# Patient Record
Sex: MALE | Race: OTHER | Employment: UNEMPLOYED | URBAN - METROPOLITAN AREA
[De-identification: names, ages, dates, MRNs, and addresses within clinical notes are randomized per-mention and may not be internally consistent; named-entity substitution may affect disease eponyms.]

---

## 2024-10-16 ENCOUNTER — HOSPITAL ENCOUNTER (EMERGENCY)
Facility: HOSPITAL | Age: 24
Discharge: HOME | End: 2024-10-17
Attending: EMERGENCY MEDICINE

## 2024-10-16 ENCOUNTER — APPOINTMENT (EMERGENCY)
Dept: CT IMAGING | Facility: HOSPITAL | Age: 24
End: 2024-10-16

## 2024-10-16 DIAGNOSIS — R10.9 ACUTE LEFT FLANK PAIN: ICD-10-CM

## 2024-10-16 DIAGNOSIS — N39.0 UTI (URINARY TRACT INFECTION): Primary | ICD-10-CM

## 2024-10-16 DIAGNOSIS — R11.2 NAUSEA & VOMITING: ICD-10-CM

## 2024-10-16 LAB
ALBUMIN SERPL BCG-MCNC: 4.8 G/DL (ref 3.5–5)
ALP SERPL-CCNC: 104 U/L (ref 34–104)
ALT SERPL W P-5'-P-CCNC: 10 U/L (ref 7–52)
ANION GAP SERPL CALCULATED.3IONS-SCNC: 10 MMOL/L (ref 4–13)
AST SERPL W P-5'-P-CCNC: 22 U/L (ref 13–39)
BACTERIA UR QL AUTO: ABNORMAL /HPF
BASOPHILS # BLD AUTO: 0.03 THOUSANDS/ΜL (ref 0–0.1)
BASOPHILS NFR BLD AUTO: 0 % (ref 0–1)
BILIRUB SERPL-MCNC: 0.36 MG/DL (ref 0.2–1)
BILIRUB UR QL STRIP: NEGATIVE
BUN SERPL-MCNC: 12 MG/DL (ref 5–25)
CALCIUM SERPL-MCNC: 9.4 MG/DL (ref 8.4–10.2)
CHLORIDE SERPL-SCNC: 108 MMOL/L (ref 96–108)
CLARITY UR: CLEAR
CO2 SERPL-SCNC: 24 MMOL/L (ref 21–32)
COLOR UR: ABNORMAL
CREAT SERPL-MCNC: 1.01 MG/DL (ref 0.6–1.3)
EOSINOPHIL # BLD AUTO: 0.24 THOUSAND/ΜL (ref 0–0.61)
EOSINOPHIL NFR BLD AUTO: 3 % (ref 0–6)
ERYTHROCYTE [DISTWIDTH] IN BLOOD BY AUTOMATED COUNT: 11.8 % (ref 11.6–15.1)
GFR SERPL CREATININE-BSD FRML MDRD: 105 ML/MIN/1.73SQ M
GLUCOSE SERPL-MCNC: 93 MG/DL (ref 65–140)
GLUCOSE UR STRIP-MCNC: NEGATIVE MG/DL
HCT VFR BLD AUTO: 41.7 % (ref 36.5–49.3)
HGB BLD-MCNC: 14.6 G/DL (ref 12–17)
HGB UR QL STRIP.AUTO: NEGATIVE
IMM GRANULOCYTES # BLD AUTO: 0.01 THOUSAND/UL (ref 0–0.2)
IMM GRANULOCYTES NFR BLD AUTO: 0 % (ref 0–2)
KETONES UR STRIP-MCNC: NEGATIVE MG/DL
LEUKOCYTE ESTERASE UR QL STRIP: 100
LIPASE SERPL-CCNC: 35 U/L (ref 11–82)
LYMPHOCYTES # BLD AUTO: 2.57 THOUSANDS/ΜL (ref 0.6–4.47)
LYMPHOCYTES NFR BLD AUTO: 29 % (ref 14–44)
MCH RBC QN AUTO: 31.1 PG (ref 26.8–34.3)
MCHC RBC AUTO-ENTMCNC: 35 G/DL (ref 31.4–37.4)
MCV RBC AUTO: 89 FL (ref 82–98)
MONOCYTES # BLD AUTO: 0.65 THOUSAND/ΜL (ref 0.17–1.22)
MONOCYTES NFR BLD AUTO: 7 % (ref 4–12)
MUCOUS THREADS UR QL AUTO: ABNORMAL
NEUTROPHILS # BLD AUTO: 5.4 THOUSANDS/ΜL (ref 1.85–7.62)
NEUTS SEG NFR BLD AUTO: 61 % (ref 43–75)
NITRITE UR QL STRIP: NEGATIVE
NON-SQ EPI CELLS URNS QL MICRO: ABNORMAL /HPF
NRBC BLD AUTO-RTO: 0 /100 WBCS
PH UR STRIP.AUTO: 6.5 [PH]
PLATELET # BLD AUTO: 302 THOUSANDS/UL (ref 149–390)
PMV BLD AUTO: 9.5 FL (ref 8.9–12.7)
POTASSIUM SERPL-SCNC: 4.2 MMOL/L (ref 3.5–5.3)
PROT SERPL-MCNC: 7.2 G/DL (ref 6.4–8.4)
PROT UR STRIP-MCNC: ABNORMAL MG/DL
RBC # BLD AUTO: 4.69 MILLION/UL (ref 3.88–5.62)
RBC #/AREA URNS AUTO: ABNORMAL /HPF
SODIUM SERPL-SCNC: 142 MMOL/L (ref 135–147)
SP GR UR STRIP.AUTO: 1.02 (ref 1–1.04)
UROBILINOGEN UA: NEGATIVE MG/DL
WBC # BLD AUTO: 8.9 THOUSAND/UL (ref 4.31–10.16)
WBC #/AREA URNS AUTO: ABNORMAL /HPF
WBC CASTS URNS QL MICRO: ABNORMAL /LPF

## 2024-10-16 PROCEDURE — 96361 HYDRATE IV INFUSION ADD-ON: CPT

## 2024-10-16 PROCEDURE — 81003 URINALYSIS AUTO W/O SCOPE: CPT

## 2024-10-16 PROCEDURE — 87086 URINE CULTURE/COLONY COUNT: CPT

## 2024-10-16 PROCEDURE — 99284 EMERGENCY DEPT VISIT MOD MDM: CPT

## 2024-10-16 PROCEDURE — 74177 CT ABD & PELVIS W/CONTRAST: CPT

## 2024-10-16 PROCEDURE — 80053 COMPREHEN METABOLIC PANEL: CPT

## 2024-10-16 PROCEDURE — 85025 COMPLETE CBC W/AUTO DIFF WBC: CPT

## 2024-10-16 PROCEDURE — 83690 ASSAY OF LIPASE: CPT

## 2024-10-16 PROCEDURE — 81001 URINALYSIS AUTO W/SCOPE: CPT

## 2024-10-16 PROCEDURE — 36415 COLL VENOUS BLD VENIPUNCTURE: CPT

## 2024-10-16 RX ADMIN — SODIUM CHLORIDE 1000 ML: 0.9 INJECTION, SOLUTION INTRAVENOUS at 21:48

## 2024-10-16 RX ADMIN — IOHEXOL 85 ML: 350 INJECTION, SOLUTION INTRAVENOUS at 23:27

## 2024-10-17 VITALS
HEART RATE: 50 BPM | SYSTOLIC BLOOD PRESSURE: 135 MMHG | OXYGEN SATURATION: 99 % | WEIGHT: 154.1 LBS | DIASTOLIC BLOOD PRESSURE: 80 MMHG | TEMPERATURE: 98.2 F | RESPIRATION RATE: 18 BRPM

## 2024-10-17 PROCEDURE — 96374 THER/PROPH/DIAG INJ IV PUSH: CPT

## 2024-10-17 RX ORDER — PHENAZOPYRIDINE HYDROCHLORIDE 200 MG/1
200 TABLET, FILM COATED ORAL 3 TIMES DAILY
Qty: 6 TABLET | Refills: 0 | Status: SHIPPED | OUTPATIENT
Start: 2024-10-17

## 2024-10-17 RX ORDER — CEPHALEXIN 500 MG/1
500 CAPSULE ORAL EVERY 6 HOURS SCHEDULED
Qty: 28 CAPSULE | Refills: 0 | Status: SHIPPED | OUTPATIENT
Start: 2024-10-17 | End: 2024-10-24

## 2024-10-17 RX ORDER — ONDANSETRON 4 MG/1
4 TABLET, ORALLY DISINTEGRATING ORAL EVERY 6 HOURS PRN
Qty: 20 TABLET | Refills: 0 | Status: SHIPPED | OUTPATIENT
Start: 2024-10-17

## 2024-10-17 RX ORDER — KETOROLAC TROMETHAMINE 30 MG/ML
15 INJECTION, SOLUTION INTRAMUSCULAR; INTRAVENOUS ONCE
Status: COMPLETED | OUTPATIENT
Start: 2024-10-17 | End: 2024-10-17

## 2024-10-17 RX ORDER — CEPHALEXIN 500 MG/1
500 CAPSULE ORAL ONCE
Status: COMPLETED | OUTPATIENT
Start: 2024-10-17 | End: 2024-10-17

## 2024-10-17 RX ADMIN — KETOROLAC TROMETHAMINE 15 MG: 30 INJECTION, SOLUTION INTRAMUSCULAR; INTRAVENOUS at 01:01

## 2024-10-17 RX ADMIN — CEPHALEXIN 500 MG: 500 CAPSULE ORAL at 00:57

## 2024-10-17 NOTE — ED NOTES
Patient was seen by the provider with results of testing and discharge and follow up instructions reviewed with patient. Patient was medicated with toradol prior to discharge.     Cathleen Espana RN  10/17/24 0112

## 2024-10-17 NOTE — ED PROVIDER NOTES
Time reflects when diagnosis was documented in both MDM as applicable and the Disposition within this note       Time User Action Codes Description Comment    10/17/2024 12:52 AM Mary Sims [N39.0] UTI (urinary tract infection)     10/17/2024 12:52 AM Mary Sims [R10.9] Acute left flank pain     10/17/2024 12:52 AM Mary Sims [R11.2] Nausea & vomiting           ED Disposition       ED Disposition   Discharge    Condition   Stable    Date/Time   Thu Oct 17, 2024 12:52 AM    Comment   Kenji Hernandes Joy discharge to home/self care.                   Assessment & Plan         Medical Decision Making  Patient presents for flank pain, left inguinal pain, and dysuria.  He is afebrile and nontoxic-appearing.  Differential diagnosis includes but is not limited to renal colic, UTI, pyelonephritis, inguinal hernia, colitis, diverticulitis, mesenteric adenitis, or musculoskeletal pain.  Urine microscopic showed 30-50 WBCs, but no epithelial cells or bacteria.  Given the amount of WBCs and dysuria will prescribe a course of Keflex while the urine culture is pending.  Labs and CT unremarkable.  Reviewed all results with the patient and his questions were answered.  Return precautions discussed and he verbalized understanding.  Establish care with a PCP, and return to the ED in the interim with new or worsening symptoms.    Problems Addressed:  Acute left flank pain: acute illness or injury  Nausea & vomiting: acute illness or injury  UTI (urinary tract infection): acute illness or injury    Amount and/or Complexity of Data Reviewed  Labs: ordered.  Radiology: ordered. Decision-making details documented in ED Course.    Risk  Prescription drug management.        ED Course as of 10/17/24 0533   u Oct 17, 2024   0033 CT abdomen pelvis with contrast  IMPRESSION:  No acute findings in the abdomen or pelvis.         Medications   sodium chloride 0.9 % bolus 1,000 mL (0 mL Intravenous Stopped 10/17/24  0031)   iohexol (OMNIPAQUE) 350 MG/ML injection (SINGLE-DOSE) 85 mL (85 mL Intravenous Given 10/16/24 2327)   cephalexin (KEFLEX) capsule 500 mg (500 mg Oral Given 10/17/24 0057)   ketorolac (TORADOL) injection 15 mg (15 mg Intravenous Given 10/17/24 0101)       ED Risk Strat Scores           SBIRT 20yo+      Flowsheet Row Most Recent Value   Initial Alcohol Screen: US AUDIT-C     1. How often do you have a drink containing alcohol? 0 Filed at: 10/16/2024 2057   2. How many drinks containing alcohol do you have on a typical day you are drinking?  0 Filed at: 10/16/2024 2057   3a. Male UNDER 65: How often do you have five or more drinks on one occasion? 0 Filed at: 10/16/2024 2057   3b. FEMALE Any Age, or MALE 65+: How often do you have 4 or more drinks on one occassion? 0 Filed at: 10/16/2024 2057   Audit-C Score 0 Filed at: 10/16/2024 2057   EDMUND: How many times in the past year have you...    Used an illegal drug or used a prescription medication for non-medical reasons? Never Filed at: 10/16/2024 2057              History of Present Illness       Chief Complaint   Patient presents with    Difficulty Urinating    Abdominal Pain     Pt reports via  that he has been having abdominal pain, and minimal amounts of orange urine with pain while he urinates. Reports the abdominal pain started several weeks ago, and the urine problem has been going on since he was a child, but the pain with urination started several days ago.     Medical Problem     Pt also reports lump in the right side of his groin, and numbness in his left foot that started today at work.        History reviewed. No pertinent past medical history.   History reviewed. No pertinent surgical history.   History reviewed. No pertinent family history.   Social History     Tobacco Use    Smoking status: Some Days     Types: Cigarettes    Smokeless tobacco: Never   Vaping Use    Vaping status: Never Used   Substance Use Topics    Alcohol use: Never  "   Drug use: Never      E-Cigarette/Vaping    E-Cigarette Use Never User       E-Cigarette/Vaping Substances      I have reviewed and agree with the history as documented.     The patient is a 24 y.o. male with no pertinent past medical history, who presents for evaluation of flank pain.  He reports several days of bilateral flank pain, left > right with associated dysuria, decreased urination, orange-colored urine, nausea, vomiting, and fevers.  Patient explains that he only urinates 1-2 drops at a time.  He also reports developing a \"ball\" in his groin approximately one month ago, but three days ago he noticed three \"balls\" that were painful.  None of the \"balls\" encompass the penis or testicles.  Patient denies URI symptoms, hematuria, or diarrhea.  He took 1 g of Ampicillin and ibuprofen without relief.      History provided by:  Patient and parent   used: Yes (Miradia Jalyn)        Review of Systems   Constitutional:  Positive for fever. Negative for chills.   HENT:  Negative for congestion, ear pain, rhinorrhea and sore throat.    Eyes:  Negative for pain and visual disturbance.   Respiratory:  Negative for cough and shortness of breath.    Cardiovascular:  Negative for chest pain and palpitations.   Gastrointestinal:  Positive for nausea and vomiting. Negative for abdominal pain and diarrhea.   Genitourinary:  Positive for decreased urine volume, difficulty urinating, dysuria and flank pain. Negative for frequency, hematuria, penile discharge, penile pain, penile swelling and testicular pain.   Musculoskeletal:  Negative for arthralgias, back pain and myalgias.   Skin:  Negative for color change and rash.   Neurological:  Negative for dizziness, seizures, syncope, light-headedness and headaches.   All other systems reviewed and are negative.      Objective       ED Triage Vitals   Temperature Pulse Blood Pressure Respirations SpO2 Patient Position - Orthostatic VS "   10/16/24 2054 10/16/24 2054 10/16/24 2054 10/16/24 2054 10/16/24 2054 10/16/24 2054   98.2 °F (36.8 °C) 70 167/93 12 98 % Sitting      Temp Source Heart Rate Source BP Location FiO2 (%) Pain Score    10/16/24 2054 10/16/24 2054 10/16/24 2054 -- 10/17/24 0101    Oral Monitor Left arm  6      Vitals      Date and Time Temp Pulse SpO2 Resp BP Pain Score FACES Pain Rating User   10/17/24 0113 -- 50 99 % 18 135/80 -- -- EY   10/17/24 0101 -- -- -- -- -- 6 -- EY   10/16/24 2337 -- 50 100 % 16 146/92 -- -- EY   10/16/24 2054 98.2 °F (36.8 °C) 70 98 % 12 167/93 -- -- RS            Physical Exam  Vitals and nursing note reviewed. Exam conducted with a chaperone present (RAMBO Diaz).   Constitutional:       General: He is awake.      Appearance: He is well-developed, well-groomed and normal weight. He is not toxic-appearing or diaphoretic.   HENT:      Head: Normocephalic and atraumatic.      Right Ear: External ear normal.      Left Ear: External ear normal.      Nose: Nose normal.      Mouth/Throat:      Lips: Pink.      Mouth: Mucous membranes are moist.      Pharynx: Oropharynx is clear. Uvula midline.   Eyes:      General: Lids are normal. Vision grossly intact. Gaze aligned appropriately. No scleral icterus.     Conjunctiva/sclera: Conjunctivae normal.      Pupils: Pupils are equal, round, and reactive to light.   Cardiovascular:      Rate and Rhythm: Normal rate and regular rhythm.      Heart sounds: S1 normal and S2 normal. No murmur heard.     No friction rub. No gallop.   Pulmonary:      Effort: Pulmonary effort is normal. No respiratory distress.      Breath sounds: Normal breath sounds and air entry. No wheezing, rhonchi or rales.   Abdominal:      General: Abdomen is flat. There is no distension.      Palpations: Abdomen is soft. There is no mass.      Tenderness: There is abdominal tenderness in the left lower quadrant. There is no right CVA tenderness, left CVA tenderness, guarding or rebound.      Hernia:  There is no hernia in the left inguinal area or right inguinal area.      Comments: Mild tenderness in the LLQ without peritoneal signs.   Genitourinary:     Penis: Normal. No erythema, tenderness, discharge, swelling or lesions.       Testes: Normal.         Right: Mass, tenderness or swelling not present.         Left: Mass, tenderness or swelling not present.      Comments: Exquisite tenderness of the left inguinal region.  No palpable lymphadenopathy, hernias, erythema, or warm of the overlying skin.  Musculoskeletal:      Cervical back: Normal, full passive range of motion without pain and neck supple. No rigidity or crepitus. No spinous process tenderness or muscular tenderness.      Thoracic back: Tenderness present. No spasms or bony tenderness.      Lumbar back: Tenderness present. No spasms or bony tenderness.      Comments: Tenderness to palpation of bilateral flanks, left > right.  No point tenderness of the T or L-spine, step-offs, or crepitus.  No CVA tenderness.   Lymphadenopathy:      Lower Body: No right inguinal adenopathy. No left inguinal adenopathy.   Skin:     General: Skin is warm.      Capillary Refill: Capillary refill takes less than 2 seconds.      Coloration: Skin is not pale.      Findings: No rash.   Neurological:      Mental Status: He is alert and oriented to person, place, and time.   Psychiatric:         Attention and Perception: Attention normal.         Mood and Affect: Mood normal.         Speech: Speech normal.         Behavior: Behavior normal. Behavior is cooperative.         Results Reviewed       Procedure Component Value Units Date/Time    Urine Microscopic [[id number]]  (Abnormal) Collected: 10/16/24 2148    Lab Status: Final result Specimen: Urine, Other Updated: 10/16/24 2214     RBC, UA 1-2 /hpf      WBC, UA 30-50 /hpf      Epithelial Cells None Seen /hpf      Bacteria, UA None Seen /hpf      MUCUS THREADS Occasional     WBC Casts, UA 3-5 /lpf     Urine culture  [[id number]] Collected: 10/16/24 2148    Lab Status: In process Specimen: Urine, Other Updated: 10/16/24 2214    Lipase [[id number]]  (Normal) Collected: 10/16/24 2148    Lab Status: Final result Specimen: Blood from Arm, Left Updated: 10/16/24 2211     Lipase 35 u/L     Comprehensive metabolic panel [[id number]] Collected: 10/16/24 2148    Lab Status: Final result Specimen: Blood from Arm, Left Updated: 10/16/24 2211     Sodium 142 mmol/L      Potassium 4.2 mmol/L      Chloride 108 mmol/L      CO2 24 mmol/L      ANION GAP 10 mmol/L      BUN 12 mg/dL      Creatinine 1.01 mg/dL      Glucose 93 mg/dL      Calcium 9.4 mg/dL      AST 22 U/L      ALT 10 U/L      Alkaline Phosphatase 104 U/L      Total Protein 7.2 g/dL      Albumin 4.8 g/dL      Total Bilirubin 0.36 mg/dL      eGFR 105 ml/min/1.73sq m     Narrative:      [employer] guidelines for Chronic Kidney Disease (CKD):     Stage 1 with normal or high GFR (GFR > 90 mL/min/1.73 square meters)    Stage 2 Mild CKD (GFR = 60-89 mL/min/1.73 square meters)    Stage 3A Moderate CKD (GFR = 45-59 mL/min/1.73 square meters)    Stage 3B Moderate CKD (GFR = 30-44 mL/min/1.73 square meters)    Stage 4 Severe CKD (GFR = 15-29 mL/min/1.73 square meters)    Stage 5 End Stage CKD (GFR <15 mL/min/1.73 square meters)  Note: GFR calculation is accurate only with a steady state creatinine    UA w Reflex to Microscopic w Reflex to Culture [[id number]]  (Abnormal) Collected: 10/16/24 2148    Lab Status: Final result Specimen: Urine, Other Updated: 10/16/24 2207     Color, UA Straw     Clarity, UA Clear     Specific Gravity, UA 1.020     pH, UA 6.5     Leukocytes, .0     Nitrite, UA Negative     Protein, UA 15 (Trace) mg/dl      Glucose, UA Negative mg/dl      Ketones, UA Negative mg/dl      Bilirubin, UA Negative     Occult Blood, UA Negative     UROBILINOGEN UA Negative mg/dL     CBC and differential [[id number]] Collected: 10/16/24 2148    Lab Status: Final  result Specimen: Blood from Arm, Left Updated: 10/16/24 2156     WBC 8.90 Thousand/uL      RBC 4.69 Million/uL      Hemoglobin 14.6 g/dL      Hematocrit 41.7 %      MCV 89 fL      MCH 31.1 pg      MCHC 35.0 g/dL      RDW 11.8 %      MPV 9.5 fL      Platelets 302 Thousands/uL      nRBC 0 /100 WBCs      Segmented % 61 %      Immature Grans % 0 %      Lymphocytes % 29 %      Monocytes % 7 %      Eosinophils Relative 3 %      Basophils Relative 0 %      Absolute Neutrophils 5.40 Thousands/µL      Absolute Immature Grans 0.01 Thousand/uL      Absolute Lymphocytes 2.57 Thousands/µL      Absolute Monocytes 0.65 Thousand/µL      Eosinophils Absolute 0.24 Thousand/µL      Basophils Absolute 0.03 Thousands/µL             CT abdomen pelvis with contrast   Final Interpretation by Jocelyn Michael MD (10/16 2356)      No acute findings in the abdomen or pelvis.         Workstation performed: [id number]             Procedures      ED Medication and Procedure Management   None     Discharge Medication List as of 10/17/2024  1:05 AM        START taking these medications    Details   cephalexin (KEFLEX) 500 mg capsule Take 1 capsule (500 mg total) by mouth every 6 (six) hours for 7 days, Starting Thu 10/17/2024, Until Thu 10/24/2024, Normal      ondansetron (ZOFRAN-ODT) 4 mg disintegrating tablet Take 1 tablet (4 mg total) by mouth every 6 (six) hours as needed for nausea or vomiting, Starting Thu 10/17/2024, Normal      phenazopyridine (PYRIDIUM) 200 mg tablet Take 1 tablet (200 mg total) by mouth 3 (three) times a day, Starting Thu 10/17/2024, Normal           No discharge procedures on file.  ED SEPSIS DOCUMENTATION   Time reflects when diagnosis was documented in both MDM as applicable and the Disposition within this note       Time User Action Codes Description Comment    10/17/2024 12:52 AM Mary Sims [N39.0] UTI (urinary tract infection)     10/17/2024 12:52 AM Mary Sims Add [R10.9] Acute left flank pain      10/17/2024 12:52 AM Mary Sims Add [R11.2] Nausea & vomiting              Mary Sims PA-C  10/17/24 0551

## 2024-10-18 LAB — BACTERIA UR CULT: NORMAL

## 2025-04-26 ENCOUNTER — HOSPITAL ENCOUNTER (EMERGENCY)
Facility: HOSPITAL | Age: 25
Discharge: HOME | End: 2025-04-26
Attending: EMERGENCY MEDICINE

## 2025-04-26 ENCOUNTER — HOSPITAL ENCOUNTER (EMERGENCY)
Facility: HOSPITAL | Age: 25
Discharge: HOME | End: 2025-04-26
Attending: EMERGENCY MEDICINE | Admitting: EMERGENCY MEDICINE

## 2025-04-26 VITALS
RESPIRATION RATE: 16 BRPM | WEIGHT: 154.98 LBS | OXYGEN SATURATION: 99 % | HEART RATE: 56 BPM | DIASTOLIC BLOOD PRESSURE: 78 MMHG | SYSTOLIC BLOOD PRESSURE: 140 MMHG | TEMPERATURE: 97.8 F

## 2025-04-26 VITALS
TEMPERATURE: 98.2 F | OXYGEN SATURATION: 98 % | WEIGHT: 154.32 LBS | DIASTOLIC BLOOD PRESSURE: 80 MMHG | SYSTOLIC BLOOD PRESSURE: 142 MMHG | HEART RATE: 69 BPM | RESPIRATION RATE: 18 BRPM

## 2025-04-26 DIAGNOSIS — R19.7 NAUSEA, VOMITING, AND DIARRHEA: Primary | ICD-10-CM

## 2025-04-26 DIAGNOSIS — R11.2 NAUSEA, VOMITING, AND DIARRHEA: Primary | ICD-10-CM

## 2025-04-26 DIAGNOSIS — R19.7 DIARRHEA: ICD-10-CM

## 2025-04-26 DIAGNOSIS — R11.2 NAUSEA AND VOMITING: Primary | ICD-10-CM

## 2025-04-26 LAB
ALBUMIN SERPL BCG-MCNC: 4.7 G/DL (ref 3.5–5)
ALP SERPL-CCNC: 91 U/L (ref 34–104)
ALT SERPL W P-5'-P-CCNC: 21 U/L (ref 7–52)
ANION GAP SERPL CALCULATED.3IONS-SCNC: 11 MMOL/L (ref 4–13)
AST SERPL W P-5'-P-CCNC: 24 U/L (ref 13–39)
BASOPHILS # BLD AUTO: 0.04 THOUSANDS/ÂΜL (ref 0–0.1)
BASOPHILS NFR BLD AUTO: 1 % (ref 0–1)
BILIRUB SERPL-MCNC: 0.46 MG/DL (ref 0.2–1)
BILIRUB UR QL STRIP: NEGATIVE
BUN SERPL-MCNC: 13 MG/DL (ref 5–25)
CALCIUM SERPL-MCNC: 9.5 MG/DL (ref 8.4–10.2)
CHLORIDE SERPL-SCNC: 105 MMOL/L (ref 96–108)
CLARITY UR: CLEAR
CO2 SERPL-SCNC: 23 MMOL/L (ref 21–32)
COLOR UR: ABNORMAL
CREAT SERPL-MCNC: 0.79 MG/DL (ref 0.6–1.3)
EOSINOPHIL # BLD AUTO: 0.33 THOUSAND/ÂΜL (ref 0–0.61)
EOSINOPHIL NFR BLD AUTO: 4 % (ref 0–6)
ERYTHROCYTE [DISTWIDTH] IN BLOOD BY AUTOMATED COUNT: 13.5 % (ref 11.6–15.1)
GFR SERPL CREATININE-BSD FRML MDRD: 127 ML/MIN/1.73SQ M
GLUCOSE SERPL-MCNC: 78 MG/DL (ref 65–140)
GLUCOSE UR STRIP-MCNC: NEGATIVE MG/DL
HCT VFR BLD AUTO: 39 % (ref 36.5–49.3)
HGB BLD-MCNC: 13.6 G/DL (ref 12–17)
HGB UR QL STRIP.AUTO: NEGATIVE
IMM GRANULOCYTES # BLD AUTO: 0.03 THOUSAND/UL (ref 0–0.2)
IMM GRANULOCYTES NFR BLD AUTO: 0 % (ref 0–2)
KETONES UR STRIP-MCNC: NEGATIVE MG/DL
LEUKOCYTE ESTERASE UR QL STRIP: NEGATIVE
LYMPHOCYTES # BLD AUTO: 2.44 THOUSANDS/ÂΜL (ref 0.6–4.47)
LYMPHOCYTES NFR BLD AUTO: 29 % (ref 14–44)
MCH RBC QN AUTO: 30.5 PG (ref 26.8–34.3)
MCHC RBC AUTO-ENTMCNC: 34.9 G/DL (ref 31.4–37.4)
MCV RBC AUTO: 87 FL (ref 82–98)
MONOCYTES # BLD AUTO: 0.72 THOUSAND/ÂΜL (ref 0.17–1.22)
MONOCYTES NFR BLD AUTO: 9 % (ref 4–12)
NEUTROPHILS # BLD AUTO: 4.8 THOUSANDS/ÂΜL (ref 1.85–7.62)
NEUTS SEG NFR BLD AUTO: 57 % (ref 43–75)
NITRITE UR QL STRIP: NEGATIVE
NRBC BLD AUTO-RTO: 0 /100 WBCS
PH UR STRIP.AUTO: 6 [PH]
PLATELET # BLD AUTO: 281 THOUSANDS/UL (ref 149–390)
PMV BLD AUTO: 10.1 FL (ref 8.9–12.7)
POTASSIUM SERPL-SCNC: 3.6 MMOL/L (ref 3.5–5.3)
PROT SERPL-MCNC: 7.4 G/DL (ref 6.4–8.4)
PROT UR STRIP-MCNC: NEGATIVE MG/DL
RBC # BLD AUTO: 4.46 MILLION/UL (ref 3.88–5.62)
SODIUM SERPL-SCNC: 139 MMOL/L (ref 135–147)
SP GR UR STRIP.AUTO: 1.03 (ref 1–1.04)
UROBILINOGEN UA: NEGATIVE MG/DL
WBC # BLD AUTO: 8.36 THOUSAND/UL (ref 4.31–10.16)

## 2025-04-26 PROCEDURE — 85025 COMPLETE CBC W/AUTO DIFF WBC: CPT | Performed by: EMERGENCY MEDICINE

## 2025-04-26 PROCEDURE — 96374 THER/PROPH/DIAG INJ IV PUSH: CPT

## 2025-04-26 PROCEDURE — 99284 EMERGENCY DEPT VISIT MOD MDM: CPT

## 2025-04-26 PROCEDURE — 36415 COLL VENOUS BLD VENIPUNCTURE: CPT | Performed by: EMERGENCY MEDICINE

## 2025-04-26 PROCEDURE — 99283 EMERGENCY DEPT VISIT LOW MDM: CPT

## 2025-04-26 PROCEDURE — 99284 EMERGENCY DEPT VISIT MOD MDM: CPT | Performed by: EMERGENCY MEDICINE

## 2025-04-26 PROCEDURE — 80053 COMPREHEN METABOLIC PANEL: CPT | Performed by: EMERGENCY MEDICINE

## 2025-04-26 PROCEDURE — 96361 HYDRATE IV INFUSION ADD-ON: CPT

## 2025-04-26 RX ORDER — DICYCLOMINE HCL 20 MG
20 TABLET ORAL 2 TIMES DAILY
Qty: 20 TABLET | Refills: 0 | Status: SHIPPED | OUTPATIENT
Start: 2025-04-26

## 2025-04-26 RX ORDER — DICYCLOMINE HCL 20 MG
20 TABLET ORAL ONCE
Status: COMPLETED | OUTPATIENT
Start: 2025-04-26 | End: 2025-04-26

## 2025-04-26 RX ORDER — ONDANSETRON 4 MG/1
4 TABLET, ORALLY DISINTEGRATING ORAL EVERY 8 HOURS PRN
Qty: 20 TABLET | Refills: 0 | Status: SHIPPED | OUTPATIENT
Start: 2025-04-26

## 2025-04-26 RX ORDER — MAGNESIUM HYDROXIDE/ALUMINUM HYDROXICE/SIMETHICONE 120; 1200; 1200 MG/30ML; MG/30ML; MG/30ML
15 SUSPENSION ORAL ONCE
Status: COMPLETED | OUTPATIENT
Start: 2025-04-26 | End: 2025-04-26

## 2025-04-26 RX ORDER — LIDOCAINE HYDROCHLORIDE 20 MG/ML
15 SOLUTION OROPHARYNGEAL ONCE
Status: COMPLETED | OUTPATIENT
Start: 2025-04-26 | End: 2025-04-26

## 2025-04-26 RX ORDER — ACETAMINOPHEN 500 MG
500 TABLET ORAL EVERY 6 HOURS PRN
Qty: 30 TABLET | Refills: 0 | Status: SHIPPED | OUTPATIENT
Start: 2025-04-26

## 2025-04-26 RX ORDER — LOPERAMIDE HYDROCHLORIDE 2 MG/1
2 CAPSULE ORAL 4 TIMES DAILY PRN
Qty: 12 CAPSULE | Refills: 0 | Status: SHIPPED | OUTPATIENT
Start: 2025-04-26

## 2025-04-26 RX ORDER — ONDANSETRON 2 MG/ML
4 INJECTION INTRAMUSCULAR; INTRAVENOUS ONCE
Status: COMPLETED | OUTPATIENT
Start: 2025-04-26 | End: 2025-04-26

## 2025-04-26 RX ADMIN — DICYCLOMINE HYDROCHLORIDE 20 MG: 20 TABLET ORAL at 00:50

## 2025-04-26 RX ADMIN — LIDOCAINE HYDROCHLORIDE 15 ML: 20 SOLUTION ORAL at 01:26

## 2025-04-26 RX ADMIN — ALUMINUM HYDROXIDE, MAGNESIUM HYDROXIDE, AND DIMETHICONE 15 ML: 200; 20; 200 SUSPENSION ORAL at 01:26

## 2025-04-26 RX ADMIN — SODIUM CHLORIDE 1000 ML: 0.9 INJECTION, SOLUTION INTRAVENOUS at 00:49

## 2025-04-26 RX ADMIN — ONDANSETRON 4 MG: 2 INJECTION INTRAMUSCULAR; INTRAVENOUS at 00:49

## 2025-04-26 NOTE — ED ATTENDING ATTESTATION
4/26/2025  I, Cale Messer MD, saw and evaluated the patient. I have discussed the patient with the resident/non-physician practitioner and agree with the resident's/non-physician practitioner's findings, Plan of Care, and MDM as documented in the resident's/non-physician practitioner's note, except where noted. All available labs and Radiology studies were reviewed.  I was present for key portions of any procedure(s) performed by the resident/non-physician practitioner and I was immediately available to provide assistance.       At this point I agree with the current assessment done in the Emergency Department.  I have conducted an independent evaluation of this patient a history and physical is as follows:    24-year-old male presents back to the emergency department for evaluation of ongoing left-sided abdominal pain, nausea, vomiting, and diarrhea.  Patient was seen here in the emergency department a few hours ago, had abdominal labs performed which were unremarkable was discharged home with symptomatic treatment.  He states unfortunately the pharmacy was not open yet so he was unable to obtain prescribed medications.  Pain is located in the left upper quadrant and is described as cramping in nature.    On exam, patient resting comfortably in bed in no acute distress, and is normocephalic atraumatic, pupils equal round reactive, heart is regular rate and rhythm with a distal pulses, no increased work of breathing, respiratory distress, or stridor.  Abdomen is soft with left upper quadrant tenderness to palpation.  No rebound or guarding.    Suspect symptoms likely secondary to ongoing gastroenteritis, unlikely to represent acute appendicitis, pancreatitis, or cholecystitis.  Reviewed labs and urinalysis performed earlier emergency room unremarkable, do not believe these are worth repeating at this time.  Discussed possible imaging however patient defers at this time.  Plan for ongoing symptomatic treatment  and good return precautions.      ED Course         Critical Care Time  Procedures

## 2025-04-26 NOTE — Clinical Note
Kenji Hamilton was seen and treated in our emergency department on 4/26/2025.                Diagnosis:     Kenji  may return to work on return date.    He may return on this date: 04/28/2025         If you have any questions or concerns, please don't hesitate to call.      Yasmin Winkler PA-C    ______________________________           _______________          _______________  Hospital Representative                              Date                                Time

## 2025-04-26 NOTE — ED PROVIDER NOTES
Time reflects when diagnosis was documented in both MDM as applicable and the Disposition within this note       Time User Action Codes Description Comment    4/26/2025  8:38 AM Yasmin Winkler Add [R11.2,  R19.7] Nausea, vomiting, and diarrhea           ED Disposition       ED Disposition   Discharge    Condition   Stable    Date/Time   Sat Apr 26, 2025  8:38 AM    Comment   Kenji Greta Whittingtondandre discharge to home/self care.                   Assessment & Plan       Medical Decision Making  Reviewed labs from a few hours ago, they were unremarkable. On exam there is no pain in the RLQ or umbilicus to suggest appendicitis, no pain in the RUQ to suggest cholecystitis, cholelithiasis, biliary colic. There is pain in the LUQ most likely due to the vomiting. No overlying skin changes. There is no fever as well to suggest severe intra-abdominal pathology. There is no guarding, peritonitis on exam.   Discussed obtaining a CT scan versus observing, with shared clinical decision making opted to defer. Labs were just taken a few hours ago, no indication for repeat.   Suspect gastroenteritis from food borne illness. Non bloody diarrhea. No abx indicated.   Discussed eating bland diet, and good hydration methods. Patient verbally understands.  Discussed further supportive care.  Patient requesting work note.  =================  Pt stable at time of discharge, vital signs reviewed, questions answered. Strict ER return precautions provided/discussed and were well understood by patient. Patient's vitals, labs and/or imaging results, diagnosis, and treatment plan were discussed with the patient. All new and/or changed medications were discussed - specifically to include route of administration, how often to take, when to take, and the pharmacy they were sent to. Strict return precautions as well as close follow up with PCP was discussed with the patient and the patient was agreeable to my recommendations.  Patient verbally  "acknowledged understanding. All labs, imaging were reviewed and used in the medical decision making process (if ordered).     Portions of this chart may have been written with voice recognition software.  Occasional grammatical errors, wrong word or \"sound a like\" substitutions may have occurred due to software limitations.  Please read carefully and use context to recognize where substitutions have occurred.    Problems Addressed:  Nausea, vomiting, and diarrhea: acute illness or injury    Amount and/or Complexity of Data Reviewed  External Data Reviewed: labs and notes.     Details: Reviewed the ED visit from earlier today to include both the labs and the note.    Risk  OTC drugs.  Prescription drug management.             Medications - No data to display    ED Risk Strat Scores                    No data recorded        SBIRT 22yo+      Flowsheet Row Most Recent Value   Initial Alcohol Screen: US AUDIT-C     1. How often do you have a drink containing alcohol? 0 Filed at: 04/26/2025 0812   2. How many drinks containing alcohol do you have on a typical day you are drinking?  0 Filed at: 04/26/2025 0812   3a. Male UNDER 65: How often do you have five or more drinks on one occasion? 0 Filed at: 04/26/2025 0812   3b. FEMALE Any Age, or MALE 65+: How often do you have 4 or more drinks on one occassion? 0 Filed at: 04/26/2025 0812   Audit-C Score 0 Filed at: 04/26/2025 0812   EDMUND: How many times in the past year have you...    Used an illegal drug or used a prescription medication for non-medical reasons? Never Filed at: 04/26/2025 0812                            History of Present Illness       Chief Complaint   Patient presents with    Abdominal Pain     Generalized; patient says the pharmacy has not opened yet so he has not obtained scripts from his visit last night; c/o continued pain       History reviewed. No pertinent past medical history.   History reviewed. No pertinent surgical history.   History reviewed. " No pertinent family history.   Social History     Tobacco Use    Smoking status: Some Days     Types: Cigarettes    Smokeless tobacco: Never   Vaping Use    Vaping status: Never Used   Substance Use Topics    Alcohol use: Never    Drug use: Never      E-Cigarette/Vaping    E-Cigarette Use Never User       E-Cigarette/Vaping Substances      I have reviewed and agree with the history as documented.     Kenji is a 24 year old male presenting to the ED with left sided abdominal pain, nausea, vomiting, diarrhea x last night. This pain developed after eating at a restaurant rice, beans, beef meal. He states he was the only one to eat that. He then developed nausea and vomiting, was seen in the ED early this morning with reassuring labs and symptomatic improvement with medications. Medications were sent to the pharmacy but doesn't open until 40 minutes. The pain is located in the left upper quadrant with some cramping in the left lower quadrant. Sometimes radiation to the bladder area but no urinary symptoms. He tried to work this morning but then diarrhea developed, prompting the repeat ED evaluation this morning. Has had dry heaving 4x since discharge from the ED earlier, but no vomiting. No testicular pain.         Review of Systems   Constitutional:  Negative for chills and fever.   Eyes:  Negative for photophobia and visual disturbance.   Respiratory:  Negative for cough and shortness of breath.    Cardiovascular:  Negative for chest pain and palpitations.   Gastrointestinal:  Positive for abdominal pain, diarrhea, nausea and vomiting.   Genitourinary:  Negative for decreased urine volume, dysuria, frequency, hematuria, scrotal swelling and testicular pain.   Musculoskeletal:  Negative for myalgias, neck pain and neck stiffness.   Skin:  Negative for rash.   Neurological:  Negative for light-headedness and headaches.           Objective       ED Triage Vitals [04/26/25 0810]   Temperature Pulse Blood Pressure  Respirations SpO2 Patient Position - Orthostatic VS   97.8 °F (36.6 °C) 56 140/78 16 99 % Lying      Temp Source Heart Rate Source BP Location FiO2 (%) Pain Score    Oral Monitor Left arm -- --      Vitals      Date and Time Temp Pulse SpO2 Resp BP Pain Score FACES Pain Rating User   04/26/25 0810 97.8 °F (36.6 °C) 56 99 % 16 140/78 -- -- KR            Physical Exam  Vitals reviewed.   Constitutional:       General: He is not in acute distress.     Appearance: He is well-developed. He is not ill-appearing or toxic-appearing.   HENT:      Head: Normocephalic and atraumatic.      Right Ear: External ear normal.      Left Ear: External ear normal.      Mouth/Throat:      Mouth: Mucous membranes are moist.      Pharynx: Oropharynx is clear. No oropharyngeal exudate or posterior oropharyngeal erythema.   Cardiovascular:      Rate and Rhythm: Normal rate and regular rhythm.      Pulses: Normal pulses.      Heart sounds: Normal heart sounds.   Pulmonary:      Effort: No respiratory distress.      Breath sounds: Normal breath sounds.   Abdominal:      General: There is no distension.      Palpations: Abdomen is soft. There is no mass.      Tenderness: There is abdominal tenderness in the left upper quadrant. There is no right CVA tenderness, left CVA tenderness, guarding or rebound.   Musculoskeletal:         General: Normal range of motion.      Cervical back: Normal range of motion and neck supple. No rigidity or tenderness.   Lymphadenopathy:      Cervical: No cervical adenopathy.   Skin:     General: Skin is warm and dry.      Capillary Refill: Capillary refill takes less than 2 seconds.   Neurological:      General: No focal deficit present.      Mental Status: He is alert.   Psychiatric:         Mood and Affect: Mood normal.         Behavior: Behavior normal.         Results Reviewed       None            No orders to display       Procedures    ED Medication and Procedure Management   Prior to Admission Medications    Prescriptions Last Dose Informant Patient Reported? Taking?   loperamide (IMODIUM) 2 mg capsule   No No   Sig: Take 1 capsule (2 mg total) by mouth 4 (four) times a day as needed for diarrhea   ondansetron (ZOFRAN-ODT) 4 mg disintegrating tablet   No No   Sig: Take 1 tablet (4 mg total) by mouth every 8 (eight) hours as needed for nausea or vomiting      Facility-Administered Medications: None     Discharge Medication List as of 4/26/2025  8:39 AM        START taking these medications    Details   acetaminophen (TYLENOL) 500 mg tablet Take 1 tablet (500 mg total) by mouth every 6 (six) hours as needed for mild pain, Starting Sat 4/26/2025, Normal      dicyclomine (BENTYL) 20 mg tablet Take 1 tablet (20 mg total) by mouth 2 (two) times a day, Starting Sat 4/26/2025, Normal           CONTINUE these medications which have NOT CHANGED    Details   loperamide (IMODIUM) 2 mg capsule Take 1 capsule (2 mg total) by mouth 4 (four) times a day as needed for diarrhea, Starting Sat 4/26/2025, Normal      ondansetron (ZOFRAN-ODT) 4 mg disintegrating tablet Take 1 tablet (4 mg total) by mouth every 8 (eight) hours as needed for nausea or vomiting, Starting Sat 4/26/2025, Normal           No discharge procedures on file.  ED SEPSIS DOCUMENTATION   Time reflects when diagnosis was documented in both MDM as applicable and the Disposition within this note       Time User Action Codes Description Comment    4/26/2025  8:38 AM Yasmin Winkler Add [R11.2,  R19.7] Nausea, vomiting, and diarrhea                  Yasmin Winkler PA-C  04/26/25 5684

## 2025-04-26 NOTE — ED PROVIDER NOTES
Time reflects when diagnosis was documented in both MDM as applicable and the Disposition within this note       Time User Action Codes Description Comment    4/26/2025  2:01 AM William Hdez Add [R11.2] Nausea and vomiting     4/26/2025  2:01 AM William Hdez Add [R19.7] Diarrhea           ED Disposition       ED Disposition   Discharge    Condition   Stable    Date/Time   Sat Apr 26, 2025  2:00 AM    Comment   Kenji Hernandes Joy discharge to home/self care.                   Assessment & Plan       Medical Decision Making  24-year-old male presents with complaint of nausea, vomiting, diarrhea, abdominal pain.  Symptoms began around 930 pm after he ate rice, beans, and meat.  Denies any fevers, chest pain, difficulty breathing.  He took medications prior to arrival.  No known sick contacts.  On exam the patient appears to feel unwell but appears to be relatively well-hydrated and nontoxic.  He has pain primarily across the epigastric region along with the left upper and lower quadrants of his abdomen.  Low suspicion for appendicitis, gallbladder disease, gastritis.  Plan to check labs and provide fluids and medications.    Amount and/or Complexity of Data Reviewed  Labs: ordered.    Risk  OTC drugs.  Prescription drug management.        ED Course as of 04/26/25 0204   Sat Apr 26, 2025   0201 Nausea has fully resolved.  No recurrent episodes in the ED.  Abdominal pain almost fully resolved as well.  Pt was sleeping on re-eval.  Will d/c home with outpt f/u.       Medications   sodium chloride 0.9 % bolus 1,000 mL (0 mL Intravenous Stopped 4/26/25 0136)   ondansetron (ZOFRAN) injection 4 mg (4 mg Intravenous Given 4/26/25 0049)   dicyclomine (BENTYL) tablet 20 mg (20 mg Oral Given 4/26/25 0050)   aluminum-magnesium hydroxide-simethicone (MAALOX) oral suspension 15 mL (15 mL Oral Given 4/26/25 0126)   Lidocaine Viscous HCl (XYLOCAINE) 2 % mucosal solution 15 mL (15 mL Swish & Swallow Given 4/26/25 0126)       ED  Risk Strat Scores                    No data recorded        SBIRT 20yo+      Flowsheet Row Most Recent Value   Initial Alcohol Screen: US AUDIT-C     1. How often do you have a drink containing alcohol? 0 Filed at: 04/26/2025 0039   2. How many drinks containing alcohol do you have on a typical day you are drinking?  0 Filed at: 04/26/2025 0039   3a. Male UNDER 65: How often do you have five or more drinks on one occasion? 0 Filed at: 04/26/2025 0039   Audit-C Score 0 Filed at: 04/26/2025 0039   EDMUND: How many times in the past year have you...    Used an illegal drug or used a prescription medication for non-medical reasons? Never Filed at: 04/26/2025 0039                            History of Present Illness       Chief Complaint   Patient presents with    Abdominal Pain     Pt having abdominal pain with n/v/d that began around 2130. No meds pta.        History reviewed. No pertinent past medical history.   History reviewed. No pertinent surgical history.   History reviewed. No pertinent family history.   Social History     Tobacco Use    Smoking status: Some Days     Types: Cigarettes    Smokeless tobacco: Never   Vaping Use    Vaping status: Never Used   Substance Use Topics    Alcohol use: Never    Drug use: Never      E-Cigarette/Vaping    E-Cigarette Use Never User       E-Cigarette/Vaping Substances      I have reviewed and agree with the history as documented.       Abdominal Pain  Pain location:  LUQ and LLQ  Pain quality: aching and cramping    Pain radiates to:  Does not radiate  Pain severity:  Moderate  Onset quality:  Gradual  Duration:  3 hours  Timing:  Constant  Progression:  Waxing and waning  Chronicity:  New  Relieved by:  Nothing  Worsened by:  Nothing  Ineffective treatments:  None tried  Associated symptoms: anorexia, diarrhea, nausea and vomiting    Associated symptoms: no shortness of breath        Review of Systems   Respiratory:  Negative for shortness of breath.    Gastrointestinal:   Positive for abdominal pain, anorexia, diarrhea, nausea and vomiting.   All other systems reviewed and are negative.          Objective       ED Triage Vitals   Temperature Pulse Blood Pressure Respirations SpO2 Patient Position - Orthostatic VS   04/26/25 0041 04/26/25 0039 04/26/25 0039 04/26/25 0039 04/26/25 0039 04/26/25 0039   98.2 °F (36.8 °C) 69 142/80 18 98 % Lying      Temp src Heart Rate Source BP Location FiO2 (%) Pain Score    -- -- 04/26/25 0039 -- --      Left arm        Vitals      Date and Time Temp Pulse SpO2 Resp BP Pain Score FACES Pain Rating User   04/26/25 0041 98.2 °F (36.8 °C) -- -- -- -- -- -- KB   04/26/25 0039 -- 69 98 % 18 142/80 -- -- KB            Physical Exam  Vitals and nursing note reviewed.   Constitutional:       General: He is not in acute distress.     Appearance: Normal appearance. He is well-developed. He is not ill-appearing, toxic-appearing or diaphoretic.   HENT:      Head: Normocephalic and atraumatic.      Right Ear: External ear normal.      Left Ear: External ear normal.      Nose: Nose normal.      Mouth/Throat:      Mouth: Mucous membranes are moist.      Pharynx: Oropharynx is clear.   Eyes:      Conjunctiva/sclera: Conjunctivae normal.      Pupils: Pupils are equal, round, and reactive to light.   Cardiovascular:      Rate and Rhythm: Normal rate and regular rhythm.      Heart sounds: Normal heart sounds.   Pulmonary:      Effort: Pulmonary effort is normal. No respiratory distress.      Breath sounds: Normal breath sounds.   Abdominal:      General: Bowel sounds are normal. There is no distension.      Palpations: Abdomen is soft.      Tenderness: There is abdominal tenderness in the epigastric area, left upper quadrant and left lower quadrant. There is no guarding.       Musculoskeletal:         General: Normal range of motion.      Cervical back: Neck supple. No rigidity.      Right lower leg: No edema.      Left lower leg: No edema.   Skin:     General: Skin is  warm and dry.      Capillary Refill: Capillary refill takes less than 2 seconds.   Neurological:      General: No focal deficit present.      Mental Status: He is alert and oriented to person, place, and time.   Psychiatric:         Mood and Affect: Mood normal.         Behavior: Behavior normal.         Results Reviewed       Procedure Component Value Units Date/Time    UA (URINE) with reflex to Scope [[id number]]  (Abnormal) Collected: 04/26/25 0123    Lab Status: Final result Specimen: Urine, Clean Catch Updated: 04/26/25 0132     Color, UA Kayla     Clarity, UA Clear     Specific Gravity, UA 1.030     pH, UA 6.0     Leukocytes, UA Negative     Nitrite, UA Negative     Protein, UA Negative mg/dl      Glucose, UA Negative mg/dl      Ketones, UA Negative mg/dl      Bilirubin, UA Negative     Occult Blood, UA Negative     UROBILINOGEN UA Negative mg/dL     Comprehensive metabolic panel [[id number]] Collected: 04/26/25 0059    Lab Status: Final result Specimen: Blood from Arm, Right Updated: 04/26/25 0123     Sodium 139 mmol/L      Potassium 3.6 mmol/L      Chloride 105 mmol/L      CO2 23 mmol/L      ANION GAP 11 mmol/L      BUN 13 mg/dL      Creatinine 0.79 mg/dL      Glucose 78 mg/dL      Calcium 9.5 mg/dL      AST 24 U/L      ALT 21 U/L      Alkaline Phosphatase 91 U/L      Total Protein 7.4 g/dL      Albumin 4.7 g/dL      Total Bilirubin 0.46 mg/dL      eGFR 127 ml/min/1.73sq m     Narrative:      [employer] guidelines for Chronic Kidney Disease (CKD):     Stage 1 with normal or high GFR (GFR > 90 mL/min/1.73 square meters)    Stage 2 Mild CKD (GFR = 60-89 mL/min/1.73 square meters)    Stage 3A Moderate CKD (GFR = 45-59 mL/min/1.73 square meters)    Stage 3B Moderate CKD (GFR = 30-44 mL/min/1.73 square meters)    Stage 4 Severe CKD (GFR = 15-29 mL/min/1.73 square meters)    Stage 5 End Stage CKD (GFR <15 mL/min/1.73 square meters)  Note: GFR calculation is accurate only with a steady state  creatinine    CBC and differential [[id number]] Collected: 04/26/25 0059    Lab Status: Final result Specimen: Blood from Arm, Right Updated: 04/26/25 0105     WBC 8.36 Thousand/uL      RBC 4.46 Million/uL      Hemoglobin 13.6 g/dL      Hematocrit 39.0 %      MCV 87 fL      MCH 30.5 pg      MCHC 34.9 g/dL      RDW 13.5 %      MPV 10.1 fL      Platelets 281 Thousands/uL      nRBC 0 /100 WBCs      Segmented % 57 %      Immature Grans % 0 %      Lymphocytes % 29 %      Monocytes % 9 %      Eosinophils Relative 4 %      Basophils Relative 1 %      Absolute Neutrophils 4.80 Thousands/µL      Absolute Immature Grans 0.03 Thousand/uL      Absolute Lymphocytes 2.44 Thousands/µL      Absolute Monocytes 0.72 Thousand/µL      Eosinophils Absolute 0.33 Thousand/µL      Basophils Absolute 0.04 Thousands/µL             No orders to display       Procedures    ED Medication and Procedure Management   Prior to Admission Medications   Prescriptions Last Dose Informant Patient Reported? Taking?   ondansetron (ZOFRAN-ODT) 4 mg disintegrating tablet   No No   Sig: Take 1 tablet (4 mg total) by mouth every 6 (six) hours as needed for nausea or vomiting   phenazopyridine (PYRIDIUM) 200 mg tablet   No No   Sig: Take 1 tablet (200 mg total) by mouth 3 (three) times a day      Facility-Administered Medications: None     Patient's Medications   Discharge Prescriptions    LOPERAMIDE (IMODIUM) 2 MG CAPSULE    Take 1 capsule (2 mg total) by mouth 4 (four) times a day as needed for diarrhea       Start Date: 4/26/2025 End Date: --       Order Dose: 2 mg       Quantity: 12 capsule    Refills: 0    ONDANSETRON (ZOFRAN-ODT) 4 MG DISINTEGRATING TABLET    Take 1 tablet (4 mg total) by mouth every 8 (eight) hours as needed for nausea or vomiting       Start Date: 4/26/2025 End Date: --       Order Dose: 4 mg       Quantity: 20 tablet    Refills: 0     No discharge procedures on file.  ED SEPSIS DOCUMENTATION   Time reflects when diagnosis was  documented in both MDM as applicable and the Disposition within this note       Time User Action Codes Description Comment    4/26/2025  2:01 AM William Hdez Add [R11.2] Nausea and vomiting     4/26/2025  2:01 AM William Hdez Add [R19.7] Diarrhea                  William Hdez,   04/26/25 0204